# Patient Record
Sex: MALE | Race: WHITE | Employment: PART TIME | URBAN - METROPOLITAN AREA
[De-identification: names, ages, dates, MRNs, and addresses within clinical notes are randomized per-mention and may not be internally consistent; named-entity substitution may affect disease eponyms.]

---

## 2022-07-20 ENCOUNTER — HOSPITAL ENCOUNTER (OUTPATIENT)
Dept: PREADMISSION TESTING | Age: 32
Discharge: HOME | End: 2022-07-20
Payer: MEDICAID

## 2022-07-20 VITALS
BODY MASS INDEX: 30.27 KG/M2 | RESPIRATION RATE: 18 BRPM | SYSTOLIC BLOOD PRESSURE: 148 MMHG | OXYGEN SATURATION: 100 % | HEART RATE: 105 BPM | DIASTOLIC BLOOD PRESSURE: 78 MMHG | TEMPERATURE: 98.2 F | HEIGHT: 74 IN | WEIGHT: 235.89 LBS

## 2022-07-20 LAB
ANION GAP SERPL CALC-SCNC: 6 MMOL/L (ref 5–15)
APTT PPP: 28.2 SEC (ref 21.2–34.1)
BUN SERPL-MCNC: 12 MG/DL (ref 6–20)
BUN/CREAT SERPL: 12 (ref 12–20)
CA-I BLD-MCNC: 9.1 MG/DL (ref 8.5–10.1)
CHLORIDE SERPL-SCNC: 106 MMOL/L (ref 97–108)
CO2 SERPL-SCNC: 27 MMOL/L (ref 21–32)
CREAT SERPL-MCNC: 0.97 MG/DL (ref 0.7–1.3)
ERYTHROCYTE [DISTWIDTH] IN BLOOD BY AUTOMATED COUNT: 12.9 % (ref 11.5–14.5)
GLUCOSE SERPL-MCNC: 99 MG/DL (ref 65–100)
HCT VFR BLD AUTO: 43.4 % (ref 36.6–50.3)
HGB BLD-MCNC: 14.3 G/DL (ref 12.1–17)
INR PPP: 1 (ref 0.9–1.1)
MCH RBC QN AUTO: 28.9 PG (ref 26–34)
MCHC RBC AUTO-ENTMCNC: 32.9 G/DL (ref 30–36.5)
MCV RBC AUTO: 87.9 FL (ref 80–99)
NRBC # BLD: 0 K/UL (ref 0–0.01)
NRBC BLD-RTO: 0 PER 100 WBC
PLATELET # BLD AUTO: 287 K/UL (ref 150–400)
PMV BLD AUTO: 10.7 FL (ref 8.9–12.9)
POTASSIUM SERPL-SCNC: 3.9 MMOL/L (ref 3.5–5.1)
PROTHROMBIN TIME: 12.9 SEC (ref 11.9–14.6)
RBC # BLD AUTO: 4.94 M/UL (ref 4.1–5.7)
SODIUM SERPL-SCNC: 139 MMOL/L (ref 136–145)
THERAPEUTIC RANGE,PTTT: NORMAL SEC (ref 82–109)
WBC # BLD AUTO: 7.9 K/UL (ref 4.1–11.1)

## 2022-07-20 PROCEDURE — 80048 BASIC METABOLIC PNL TOTAL CA: CPT

## 2022-07-20 PROCEDURE — 85730 THROMBOPLASTIN TIME PARTIAL: CPT

## 2022-07-20 PROCEDURE — 85610 PROTHROMBIN TIME: CPT

## 2022-07-20 PROCEDURE — 85027 COMPLETE CBC AUTOMATED: CPT

## 2022-07-20 PROCEDURE — 36415 COLL VENOUS BLD VENIPUNCTURE: CPT

## 2022-07-20 NOTE — PROGRESS NOTES
Dr Yolanda Harper called regarding covid test order. Stated no covid test required unless per protocol.

## 2022-07-27 ENCOUNTER — ANESTHESIA EVENT (OUTPATIENT)
Dept: SURGERY | Age: 32
End: 2022-07-27
Payer: MEDICAID

## 2022-07-27 ENCOUNTER — HOSPITAL ENCOUNTER (OUTPATIENT)
Age: 32
Discharge: HOME | End: 2022-07-27
Attending: ORTHOPAEDIC SURGERY | Admitting: ORTHOPAEDIC SURGERY
Payer: MEDICAID

## 2022-07-27 ENCOUNTER — APPOINTMENT (OUTPATIENT)
Dept: GENERAL RADIOLOGY | Age: 32
End: 2022-07-27
Attending: ORTHOPAEDIC SURGERY
Payer: MEDICAID

## 2022-07-27 ENCOUNTER — ANESTHESIA (OUTPATIENT)
Dept: SURGERY | Age: 32
End: 2022-07-27
Payer: MEDICAID

## 2022-07-27 VITALS
DIASTOLIC BLOOD PRESSURE: 71 MMHG | HEIGHT: 74 IN | RESPIRATION RATE: 16 BRPM | OXYGEN SATURATION: 97 % | BODY MASS INDEX: 30.16 KG/M2 | HEART RATE: 83 BPM | WEIGHT: 235 LBS | SYSTOLIC BLOOD PRESSURE: 109 MMHG | TEMPERATURE: 97.2 F

## 2022-07-27 DIAGNOSIS — Z98.890 HISTORY OF REMOVAL OF RETAINED HARDWARE: Primary | ICD-10-CM

## 2022-07-27 PROBLEM — M22.42 CHONDROMALACIA OF LEFT PATELLA: Status: ACTIVE | Noted: 2022-07-27

## 2022-07-27 PROCEDURE — 74011250636 HC RX REV CODE- 250/636: Performed by: ANESTHESIOLOGY

## 2022-07-27 PROCEDURE — 77030031140 HC SUT VCRL3 J&J -A: Performed by: ORTHOPAEDIC SURGERY

## 2022-07-27 PROCEDURE — 74011250636 HC RX REV CODE- 250/636: Performed by: NURSE ANESTHETIST, CERTIFIED REGISTERED

## 2022-07-27 PROCEDURE — 74011000250 HC RX REV CODE- 250: Performed by: ANESTHESIOLOGY

## 2022-07-27 PROCEDURE — 77030038066 HC BLD SHVR ARTH -B: Performed by: ORTHOPAEDIC SURGERY

## 2022-07-27 PROCEDURE — 77030004435 HC BUR RND STRY -C: Performed by: ORTHOPAEDIC SURGERY

## 2022-07-27 PROCEDURE — 77030042022 HC SYST COLD THRPY BREG -B: Performed by: ORTHOPAEDIC SURGERY

## 2022-07-27 PROCEDURE — 77030000032 HC CUF TRNQT ZIMM -B: Performed by: ORTHOPAEDIC SURGERY

## 2022-07-27 PROCEDURE — 76010000135 HC OR TIME 4 TO 4.5 HR: Performed by: ORTHOPAEDIC SURGERY

## 2022-07-27 PROCEDURE — 76210000025 HC REC RM PH II 3 TO 3.5 HR: Performed by: ORTHOPAEDIC SURGERY

## 2022-07-27 PROCEDURE — 77030006819: Performed by: ORTHOPAEDIC SURGERY

## 2022-07-27 PROCEDURE — 77030034478 HC TU IRR ARTHRO PT ARTH -B: Performed by: ORTHOPAEDIC SURGERY

## 2022-07-27 PROCEDURE — 77030013079 HC BLNKT BAIR HGGR 3M -A: Performed by: ANESTHESIOLOGY

## 2022-07-27 PROCEDURE — 77030040361 HC SLV COMPR DVT MDII -B: Performed by: ORTHOPAEDIC SURGERY

## 2022-07-27 PROCEDURE — 77030008463 HC STPLR SKN PROX J&J -B: Performed by: ORTHOPAEDIC SURGERY

## 2022-07-27 PROCEDURE — 74011000250 HC RX REV CODE- 250

## 2022-07-27 PROCEDURE — 76210000006 HC OR PH I REC 0.5 TO 1 HR: Performed by: ORTHOPAEDIC SURGERY

## 2022-07-27 PROCEDURE — 74011250637 HC RX REV CODE- 250/637: Performed by: ORTHOPAEDIC SURGERY

## 2022-07-27 PROCEDURE — 77030002916 HC SUT ETHLN J&J -A: Performed by: ORTHOPAEDIC SURGERY

## 2022-07-27 PROCEDURE — 77030022877 HC TU IRR ARTHRO PMP ARTH -B: Performed by: ORTHOPAEDIC SURGERY

## 2022-07-27 PROCEDURE — 76060000039 HC ANESTHESIA 4 TO 4.5 HR: Performed by: ORTHOPAEDIC SURGERY

## 2022-07-27 PROCEDURE — 76000 FLUOROSCOPY <1 HR PHYS/QHP: CPT

## 2022-07-27 PROCEDURE — 77030008009 HC PRB ARTHO ABLT ARTH -C: Performed by: ORTHOPAEDIC SURGERY

## 2022-07-27 PROCEDURE — 2709999900 HC NON-CHARGEABLE SUPPLY: Performed by: ORTHOPAEDIC SURGERY

## 2022-07-27 PROCEDURE — 74011250636 HC RX REV CODE- 250/636: Performed by: ORTHOPAEDIC SURGERY

## 2022-07-27 PROCEDURE — 77030031139 HC SUT VCRL2 J&J -A: Performed by: ORTHOPAEDIC SURGERY

## 2022-07-27 PROCEDURE — 77030003666 HC NDL SPINAL BD -A: Performed by: ORTHOPAEDIC SURGERY

## 2022-07-27 PROCEDURE — 77030010509 HC AIRWY LMA MSK TELE -A: Performed by: ANESTHESIOLOGY

## 2022-07-27 PROCEDURE — 74011000250 HC RX REV CODE- 250: Performed by: NURSE ANESTHETIST, CERTIFIED REGISTERED

## 2022-07-27 RX ORDER — HYDROMORPHONE HYDROCHLORIDE 1 MG/ML
INJECTION, SOLUTION INTRAMUSCULAR; INTRAVENOUS; SUBCUTANEOUS AS NEEDED
Status: DISCONTINUED | OUTPATIENT
Start: 2022-07-27 | End: 2022-07-27 | Stop reason: HOSPADM

## 2022-07-27 RX ORDER — TRAMADOL HYDROCHLORIDE 50 MG/1
50 TABLET ORAL
Qty: 15 TABLET | Refills: 0 | Status: SHIPPED | OUTPATIENT
Start: 2022-07-27 | End: 2022-08-03

## 2022-07-27 RX ORDER — FENTANYL CITRATE 50 UG/ML
50 INJECTION, SOLUTION INTRAMUSCULAR; INTRAVENOUS AS NEEDED
Status: DISCONTINUED | OUTPATIENT
Start: 2022-07-27 | End: 2022-07-27 | Stop reason: HOSPADM

## 2022-07-27 RX ORDER — SODIUM CHLORIDE 0.9 % (FLUSH) 0.9 %
5-40 SYRINGE (ML) INJECTION EVERY 8 HOURS
Status: CANCELLED | OUTPATIENT
Start: 2022-07-27

## 2022-07-27 RX ORDER — ONDANSETRON 2 MG/ML
4 INJECTION INTRAMUSCULAR; INTRAVENOUS AS NEEDED
Status: CANCELLED | OUTPATIENT
Start: 2022-07-27

## 2022-07-27 RX ORDER — KETOROLAC TROMETHAMINE 30 MG/ML
INJECTION, SOLUTION INTRAMUSCULAR; INTRAVENOUS AS NEEDED
Status: DISCONTINUED | OUTPATIENT
Start: 2022-07-27 | End: 2022-07-27 | Stop reason: HOSPADM

## 2022-07-27 RX ORDER — SODIUM CHLORIDE 0.9 % (FLUSH) 0.9 %
5-40 SYRINGE (ML) INJECTION AS NEEDED
Status: CANCELLED | OUTPATIENT
Start: 2022-07-27

## 2022-07-27 RX ORDER — OXYCODONE AND ACETAMINOPHEN 5; 325 MG/1; MG/1
1 TABLET ORAL AS NEEDED
Status: CANCELLED | OUTPATIENT
Start: 2022-07-27

## 2022-07-27 RX ORDER — MORPHINE SULFATE 2 MG/ML
2 INJECTION, SOLUTION INTRAMUSCULAR; INTRAVENOUS
Status: CANCELLED | OUTPATIENT
Start: 2022-07-27

## 2022-07-27 RX ORDER — BUPIVACAINE HYDROCHLORIDE 5 MG/ML
INJECTION, SOLUTION EPIDURAL; INTRACAUDAL
Status: COMPLETED
Start: 2022-07-27 | End: 2022-07-27

## 2022-07-27 RX ORDER — CEPHALEXIN 250 MG/1
500 CAPSULE ORAL 4 TIMES DAILY
Qty: 20 CAPSULE | Refills: 0 | Status: SHIPPED | OUTPATIENT
Start: 2022-07-27

## 2022-07-27 RX ORDER — MIDAZOLAM HYDROCHLORIDE 1 MG/ML
INJECTION, SOLUTION INTRAMUSCULAR; INTRAVENOUS AS NEEDED
Status: DISCONTINUED | OUTPATIENT
Start: 2022-07-27 | End: 2022-07-27 | Stop reason: HOSPADM

## 2022-07-27 RX ORDER — KETOROLAC TROMETHAMINE 10 MG/1
10 TABLET, FILM COATED ORAL EVERY 6 HOURS
Qty: 20 TABLET | Refills: 0 | Status: SHIPPED | OUTPATIENT
Start: 2022-07-27

## 2022-07-27 RX ORDER — LIDOCAINE HYDROCHLORIDE 10 MG/ML
0.1 INJECTION, SOLUTION EPIDURAL; INFILTRATION; INTRACAUDAL; PERINEURAL AS NEEDED
Status: DISCONTINUED | OUTPATIENT
Start: 2022-07-27 | End: 2022-07-27 | Stop reason: HOSPADM

## 2022-07-27 RX ORDER — HYDROMORPHONE HYDROCHLORIDE 1 MG/ML
0.5 INJECTION, SOLUTION INTRAMUSCULAR; INTRAVENOUS; SUBCUTANEOUS
Status: CANCELLED | OUTPATIENT
Start: 2022-07-27

## 2022-07-27 RX ORDER — DEXMEDETOMIDINE HYDROCHLORIDE 100 UG/ML
INJECTION, SOLUTION INTRAVENOUS AS NEEDED
Status: DISCONTINUED | OUTPATIENT
Start: 2022-07-27 | End: 2022-07-27 | Stop reason: HOSPADM

## 2022-07-27 RX ORDER — ACETAMINOPHEN 500 MG
1000 TABLET ORAL
Qty: 60 TABLET | Refills: 1 | Status: SHIPPED | OUTPATIENT
Start: 2022-07-27

## 2022-07-27 RX ORDER — FENTANYL CITRATE 50 UG/ML
INJECTION, SOLUTION INTRAMUSCULAR; INTRAVENOUS AS NEEDED
Status: DISCONTINUED | OUTPATIENT
Start: 2022-07-27 | End: 2022-07-27 | Stop reason: HOSPADM

## 2022-07-27 RX ORDER — KETOROLAC TROMETHAMINE 30 MG/ML
30 INJECTION, SOLUTION INTRAMUSCULAR; INTRAVENOUS
Status: CANCELLED | OUTPATIENT
Start: 2022-07-27 | End: 2022-07-28

## 2022-07-27 RX ORDER — OXYCODONE HYDROCHLORIDE 5 MG/1
5 TABLET ORAL
Status: CANCELLED | OUTPATIENT
Start: 2022-07-27

## 2022-07-27 RX ORDER — BUPIVACAINE HYDROCHLORIDE AND EPINEPHRINE 5; 5 MG/ML; UG/ML
INJECTION, SOLUTION PERINEURAL AS NEEDED
Status: DISCONTINUED | OUTPATIENT
Start: 2022-07-27 | End: 2022-07-27 | Stop reason: HOSPADM

## 2022-07-27 RX ORDER — DIPHENHYDRAMINE HYDROCHLORIDE 50 MG/ML
12.5 INJECTION, SOLUTION INTRAMUSCULAR; INTRAVENOUS AS NEEDED
Status: CANCELLED | OUTPATIENT
Start: 2022-07-27 | End: 2022-07-27

## 2022-07-27 RX ORDER — SODIUM CHLORIDE, SODIUM LACTATE, POTASSIUM CHLORIDE, CALCIUM CHLORIDE 600; 310; 30; 20 MG/100ML; MG/100ML; MG/100ML; MG/100ML
INJECTION, SOLUTION INTRAVENOUS
Status: DISCONTINUED | OUTPATIENT
Start: 2022-07-27 | End: 2022-07-27 | Stop reason: HOSPADM

## 2022-07-27 RX ORDER — DEXAMETHASONE SODIUM PHOSPHATE 4 MG/ML
INJECTION, SOLUTION INTRA-ARTICULAR; INTRALESIONAL; INTRAMUSCULAR; INTRAVENOUS; SOFT TISSUE
Status: COMPLETED
Start: 2022-07-27 | End: 2022-07-27

## 2022-07-27 RX ORDER — BUPIVACAINE HYDROCHLORIDE 5 MG/ML
INJECTION, SOLUTION EPIDURAL; INTRACAUDAL
Status: COMPLETED | OUTPATIENT
Start: 2022-07-27 | End: 2022-07-27

## 2022-07-27 RX ORDER — OXYCODONE HYDROCHLORIDE 5 MG/1
5 TABLET ORAL
Qty: 15 TABLET | Refills: 0 | Status: SHIPPED | OUTPATIENT
Start: 2022-07-27 | End: 2022-08-03

## 2022-07-27 RX ORDER — CEFAZOLIN SODIUM 1 G/3ML
INJECTION, POWDER, FOR SOLUTION INTRAMUSCULAR; INTRAVENOUS AS NEEDED
Status: DISCONTINUED | OUTPATIENT
Start: 2022-07-27 | End: 2022-07-27 | Stop reason: HOSPADM

## 2022-07-27 RX ORDER — SODIUM CHLORIDE, SODIUM LACTATE, POTASSIUM CHLORIDE, CALCIUM CHLORIDE 600; 310; 30; 20 MG/100ML; MG/100ML; MG/100ML; MG/100ML
20 INJECTION, SOLUTION INTRAVENOUS CONTINUOUS
Status: DISCONTINUED | OUTPATIENT
Start: 2022-07-27 | End: 2022-07-27 | Stop reason: HOSPADM

## 2022-07-27 RX ORDER — DEXAMETHASONE SODIUM PHOSPHATE 4 MG/ML
INJECTION, SOLUTION INTRA-ARTICULAR; INTRALESIONAL; INTRAMUSCULAR; INTRAVENOUS; SOFT TISSUE AS NEEDED
Status: DISCONTINUED | OUTPATIENT
Start: 2022-07-27 | End: 2022-07-27 | Stop reason: HOSPADM

## 2022-07-27 RX ORDER — LIDOCAINE HYDROCHLORIDE 10 MG/ML
INJECTION INFILTRATION; PERINEURAL
Status: DISCONTINUED
Start: 2022-07-27 | End: 2022-07-27 | Stop reason: HOSPADM

## 2022-07-27 RX ORDER — PROPOFOL 10 MG/ML
INJECTION, EMULSION INTRAVENOUS AS NEEDED
Status: DISCONTINUED | OUTPATIENT
Start: 2022-07-27 | End: 2022-07-27 | Stop reason: HOSPADM

## 2022-07-27 RX ORDER — EPHEDRINE SULFATE/0.9% NACL/PF 50 MG/5 ML
5 SYRINGE (ML) INTRAVENOUS AS NEEDED
Status: CANCELLED | OUTPATIENT
Start: 2022-07-27

## 2022-07-27 RX ORDER — MIDAZOLAM HYDROCHLORIDE 1 MG/ML
0.5 INJECTION, SOLUTION INTRAMUSCULAR; INTRAVENOUS
Status: CANCELLED | OUTPATIENT
Start: 2022-07-27

## 2022-07-27 RX ORDER — ONDANSETRON 2 MG/ML
INJECTION INTRAMUSCULAR; INTRAVENOUS AS NEEDED
Status: DISCONTINUED | OUTPATIENT
Start: 2022-07-27 | End: 2022-07-27 | Stop reason: HOSPADM

## 2022-07-27 RX ORDER — OXYCODONE AND ACETAMINOPHEN 5; 325 MG/1; MG/1
1 TABLET ORAL ONCE
Status: COMPLETED | OUTPATIENT
Start: 2022-07-27 | End: 2022-07-27

## 2022-07-27 RX ORDER — LIDOCAINE HYDROCHLORIDE 20 MG/ML
INJECTION, SOLUTION EPIDURAL; INFILTRATION; INTRACAUDAL; PERINEURAL AS NEEDED
Status: DISCONTINUED | OUTPATIENT
Start: 2022-07-27 | End: 2022-07-27 | Stop reason: HOSPADM

## 2022-07-27 RX ORDER — DEXAMETHASONE SODIUM PHOSPHATE 4 MG/ML
INJECTION, SOLUTION INTRA-ARTICULAR; INTRALESIONAL; INTRAMUSCULAR; INTRAVENOUS; SOFT TISSUE
Status: COMPLETED | OUTPATIENT
Start: 2022-07-27 | End: 2022-07-27

## 2022-07-27 RX ORDER — SODIUM CHLORIDE 0.9 % (FLUSH) 0.9 %
5-40 SYRINGE (ML) INJECTION AS NEEDED
Status: DISCONTINUED | OUTPATIENT
Start: 2022-07-27 | End: 2022-07-27 | Stop reason: HOSPADM

## 2022-07-27 RX ORDER — SODIUM CHLORIDE 0.9 % (FLUSH) 0.9 %
5-40 SYRINGE (ML) INJECTION EVERY 8 HOURS
Status: DISCONTINUED | OUTPATIENT
Start: 2022-07-27 | End: 2022-07-27 | Stop reason: HOSPADM

## 2022-07-27 RX ORDER — FENTANYL CITRATE 50 UG/ML
50 INJECTION, SOLUTION INTRAMUSCULAR; INTRAVENOUS
Status: CANCELLED | OUTPATIENT
Start: 2022-07-27

## 2022-07-27 RX ADMIN — DEXMEDETOMIDINE HYDROCHLORIDE 10 MCG: 100 INJECTION, SOLUTION INTRAVENOUS at 14:41

## 2022-07-27 RX ADMIN — DEXMEDETOMIDINE HYDROCHLORIDE 10 MCG: 100 INJECTION, SOLUTION INTRAVENOUS at 13:55

## 2022-07-27 RX ADMIN — BUPIVACAINE HYDROCHLORIDE 20 ML: 5 INJECTION, SOLUTION EPIDURAL; INTRACAUDAL; PERINEURAL at 10:51

## 2022-07-27 RX ADMIN — LIDOCAINE HYDROCHLORIDE 80 MG: 20 INJECTION, SOLUTION EPIDURAL; INFILTRATION; INTRACAUDAL; PERINEURAL at 11:08

## 2022-07-27 RX ADMIN — DEXAMETHASONE SODIUM PHOSPHATE 4 MG: 4 INJECTION, SOLUTION INTRA-ARTICULAR; INTRALESIONAL; INTRAMUSCULAR; INTRAVENOUS; SOFT TISSUE at 11:12

## 2022-07-27 RX ADMIN — DEXAMETHASONE SODIUM PHOSPHATE 4 MG: 4 INJECTION, SOLUTION INTRA-ARTICULAR; INTRALESIONAL; INTRAMUSCULAR; INTRAVENOUS; SOFT TISSUE at 10:51

## 2022-07-27 RX ADMIN — PROPOFOL 200 MG: 10 INJECTION, EMULSION INTRAVENOUS at 11:08

## 2022-07-27 RX ADMIN — HYDROMORPHONE HYDROCHLORIDE 1 MG: 1 INJECTION, SOLUTION INTRAMUSCULAR; INTRAVENOUS; SUBCUTANEOUS at 14:54

## 2022-07-27 RX ADMIN — SODIUM CHLORIDE, POTASSIUM CHLORIDE, SODIUM LACTATE AND CALCIUM CHLORIDE 20 ML/HR: 600; 310; 30; 20 INJECTION, SOLUTION INTRAVENOUS at 08:30

## 2022-07-27 RX ADMIN — SODIUM CHLORIDE, POTASSIUM CHLORIDE, SODIUM LACTATE AND CALCIUM CHLORIDE: 600; 310; 30; 20 INJECTION, SOLUTION INTRAVENOUS at 10:58

## 2022-07-27 RX ADMIN — DEXMEDETOMIDINE HYDROCHLORIDE 5 MCG: 100 INJECTION, SOLUTION INTRAVENOUS at 11:35

## 2022-07-27 RX ADMIN — SODIUM CHLORIDE, POTASSIUM CHLORIDE, SODIUM LACTATE AND CALCIUM CHLORIDE: 600; 310; 30; 20 INJECTION, SOLUTION INTRAVENOUS at 14:45

## 2022-07-27 RX ADMIN — MIDAZOLAM HYDROCHLORIDE 2 MG: 2 INJECTION, SOLUTION INTRAMUSCULAR; INTRAVENOUS at 10:58

## 2022-07-27 RX ADMIN — OXYCODONE AND ACETAMINOPHEN 1 TABLET: 5; 325 TABLET ORAL at 18:02

## 2022-07-27 RX ADMIN — PHENYLEPHRINE HYDROCHLORIDE 100 MCG: 10 INJECTION INTRAVENOUS at 11:54

## 2022-07-27 RX ADMIN — KETOROLAC TROMETHAMINE 30 MG: 30 INJECTION, SOLUTION INTRAMUSCULAR at 14:54

## 2022-07-27 RX ADMIN — DEXMEDETOMIDINE HYDROCHLORIDE 5 MCG: 100 INJECTION, SOLUTION INTRAVENOUS at 11:45

## 2022-07-27 RX ADMIN — ONDANSETRON 4 MG: 2 INJECTION INTRAMUSCULAR; INTRAVENOUS at 14:43

## 2022-07-27 RX ADMIN — CEFAZOLIN SODIUM 2 G: 1 INJECTION, POWDER, FOR SOLUTION INTRAMUSCULAR; INTRAVENOUS at 11:14

## 2022-07-27 RX ADMIN — FENTANYL CITRATE 100 MCG: 50 INJECTION, SOLUTION INTRAMUSCULAR; INTRAVENOUS at 11:08

## 2022-07-27 NOTE — PROGRESS NOTES
Left leg dressing clean dry intact without signs of drainage. Left foot cool, positive sensation/mobility.

## 2022-07-27 NOTE — PROGRESS NOTES
Called Dr. Wali Crandall to clarify ancef ordered for pt, with penicillin allergy. . Pt stated had hives and rash when had it in childhood and has not had any antibiotics related to penicillin.  form his office stated provider will call back. Dr. Wali Crandall called back and was informed of pt allergy, and reaction, he stated to proceed with ancef.

## 2022-07-27 NOTE — ANESTHESIA PROCEDURE NOTES
Peripheral Block    Start time: 7/27/2022 10:50 AM  End time: 7/27/2022 3:04 PM  Performed by: Cindy Geiger MD  Authorized by: Cindy Geiger MD       Pre-procedure: Indications: at surgeon's request and post-op pain management    Preanesthetic Checklist: patient identified, risks and benefits discussed, site marked, timeout performed, anesthesia consent given, patient being monitored and fire risk safety assessment completed and verbalized    Timeout Time: 10:40 EDT      Block Type:   Block Type: Adductor canal block  Laterality:  Left  Monitoring:  Standard ASA monitoring, continuous pulse ox, frequent vital sign checks, heart rate, responsive to questions and oxygen  Injection Technique:  Single shot  Procedures: ultrasound guided    Patient Position: supine  Prep comment:  Povidone/iodine x 3  Needle Type:  SonoPlex  Needle Gauge:  21 G  Needle Localization:  Anatomical landmarks and ultrasound guidance  Medication Injected:  Bupivacaine (PF) (MARCAINE) 0.5% injection - Peripheral Nerve Block   20 mL - 7/27/2022 10:51:00 AM  dexamethasone (DECADRON) 4 mg/mL injection - Peripheral Nerve Block   4 mg - 7/27/2022 10:51:00 AM  Med Admin Time: 7/27/2022 10:51 AM    Assessment:  Number of attempts:  1  Injection Assessment:  Incremental injection every 5 mL, no paresthesia, ultrasound image on chart, local visualized surrounding nerve on ultrasound, negative aspiration for blood and low pressure verified by pressure monitor  Patient tolerance:  Patient tolerated the procedure well with no immediate complications  LEFT ADDUCTOR CANAL block. Performed preop, in holding Rm. Strict asepsis,structures identified with sonosite, lidocaine 1% 2ml local/skin wheel. No complications.

## 2022-07-27 NOTE — PROGRESS NOTES
This RN at bedside to discharge patient when noted bleeding coming from surgical site on left lower leg. Contacted Ree Player and was given verbal orders on how to re-wrap his surgical site. García Glimpse from OR over to help wrap his leg. Verbal order per  to give a dose Percocet 5mg for pain. Order placed by this RN. Also other verbal order per Ree Player for patient to go home with a polar care ice pack. Meeta Mackey RN and this RN instructed mother and patient on how to use at home.

## 2022-07-27 NOTE — PROGRESS NOTES
Discharge instructions given to mother Tona Quiroz.  Appt made for 2 week fu mother and patient made aware of date and time of follow up

## 2022-07-27 NOTE — OP NOTES
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 Procedure(s):  1) LEFT KNEE ARTHROSCOPY WITH CHRONDROPLASTY DEBRIDEMENT OF PATELLA. 2) REMOVAL OF DEEP HARDWARE (IM NAIL AND LOCKING SCREWS) LEFT TIBIA - COMPLEX    Surgeon(s):  Michele Donohue MD    Surgical Assistant: Surg Asst-1: Roderick Subramanian    Anesthesia: General with regional block by Dr. Charu Sánchez and Woody Pallas    Estimated Blood Loss (mL):  less than 50     Complications: None    Tourniquet Time: 87 mins  Implants:   Implant Name Type Inv. Item Serial No.  Lot No. LRB No. Used Action   Screw   N/A  N/A Left 4 Explanted   Nail   N/A  N/A Left 1 Explanted       Preop Note: The patient is a 28-year-old gentleman who underwent management of an open fracture of his left tibia with the use of an intramedullary nail, more than 7 years ago. He went on to heal well with no long-term complications. The patient presented in my office few months ago, with progressive anterior knee pain, as well as activity related pain in his left leg. Clinical evaluation as well as an MRI of the left knee suggested patellofemoral chondromalacia, in addition to the retained hardware possibly underlying his symptoms. The patient was suggested and offered arthroscopy of the left knee with possible chondroplasty and debridement, along with an attempt at removal of the retained hardware. The patient wished to proceed with the recommended treatment plan. Informed consent was obtained and documented in the office.     Findings: 1) Significant changes of chondromalacia, Outerbridge stage II and stage III, and the median ridge of the patella, as well as femoral trochlear groove showing grade 2 changes. Ligaments and menisci were normal, along with medial and lateral compartment articular cartilage. Lateral and medial gutters were clear, spacious, normal.  Patellar tracking was normal.  2) proximal locking screws showed cold welding of the metal.  One of the proximal locking screws had fragment of broken guidewire metal, incarcerated in the screw head. These findings necessitated prolonged work at screw removal, along with the use of metal cutting pencil-tip removal of the broken metal fragment in the screw head. 3) all retained hardware was successfully removed, without any evidence of fractures or other complications. Detailed Description of Procedure: The patient and operative site were identified in the preoperative holding area. All questions were answered. After induction of anesthesia the patient was positioned supine on the OR table. High thigh tourniquet was set at 300 mmHg. Standard prepping and draping of the left lower extremity was performed with ChloraPrep, using an arthroscopy drape. Intravenous antibiotics were confirmed. Timeout was called. I used a standard anterolateral portal, and performed a diagnostic arthroscopy. Standard anteromedial portal was created under direct vision using a spinal needle for guide. Findings were as noted above. I used a 4.5 mm shaver to perform chondroplasty of the patellar chondral lesions, along with minimal use of an ArthroWand to debrided to smooth and stable service. Pre and post procedure photographs were obtained and recorded. Local anesthetic was infiltrated in the portals, and instilled in the joint. Nylon sutures were used to close the skin portals. We proceeded with the second stage, namely removal of hardware. The previous anterior midline prepatellar scar was reused, to perform a standard exposure through the sheath of the patellar tendon, going medially.   The fat pad was carefully retracted superiorly, and adequate exposure of the anterior surface of the tibial plateau was obtained. An osteotome was used to activate a small amount of bone, and the proximal end of the nail was easily accessed, and well visualized after debriding surrounding bone with a curette. The endcap was carefully unscrewed, and the inner and imagining screw was removed as well. We proceeded with removal of the locking screws. This was tedious, and took a significant amount of time due to stripping of the screw heads, and the use of the broken screw removal set for removal.  The proximal of the locking screws showed cold welding as well as significant resistance due to bone growth into the titanium. The patient's and great care, the screw was finally removed. The second screw and a fragment of metal broken and retained in the screw head from the initial surgery. After multiple attempts at removal of the screw, I used a metal cutting tip to shave off the metal fragment in the screw head. This was followed by removal of the screw with a reverse threaded screwdriver. The nail was now removed uneventfully, and showed evidence of bony ingrowth at his proximal ends with no significant loss of bone. The wounds were thoroughly irrigated. Fluoroscopic images were obtained and saved. Closure of the wounds was performed with standard technique in layers. A compressive dressing was applied. The patient tolerated the procedure well and was transferred to the recovery room in stable condition. Postoperatively, the patient will be allowed weightbearing as tolerated, with 2 crutches at all times for the next 4 weeks. Follow-up will be in 2 weeks as an outpatient.     Electronically Signed by Lloyd Velasco MD on 7/27/2022 at 3:35 PM

## 2022-07-27 NOTE — ANESTHESIA PREPROCEDURE EVALUATION
Relevant Problems   No relevant active problems       Anesthetic History   No history of anesthetic complications            Review of Systems / Medical History  Patient summary reviewed, nursing notes reviewed and pertinent labs reviewed    Pulmonary  Within defined limits                 Neuro/Psych   Within defined limits           Cardiovascular  Within defined limits                Exercise tolerance: >4 METS     GI/Hepatic/Renal  Within defined limits              Endo/Other        Obesity and arthritis     Other Findings   Comments: Left patella chondromalacia. Anesthetic Plan    ASA: 1  Anesthesia type: general and regional  ADDUCTOR CANAL BLOCK.          Induction: Intravenous  Anesthetic plan and risks discussed with: Patient

## 2022-07-27 NOTE — ANESTHESIA POSTPROCEDURE EVALUATION
Procedure(s):  LEFT KNEE ARTHROSCOPY WITH CHRONDROPLASTY DEBRIDEMENT AND REMOVAL OF DEEP HARDWARE (IM NAIL AND LOCKING SCREWS) LEFT TIBIA  HARDWARE REMOVAL LOWER EXTREMITES.     general, regional    Anesthesia Post Evaluation        Patient location during evaluation: PACU  Patient participation: complete - patient participated  Level of consciousness: awake and alert  Pain score: 0  Pain management: adequate  Airway patency: patent  Anesthetic complications: no  Cardiovascular status: hemodynamically stable  Respiratory status: spontaneous ventilation  Hydration status: acceptable  Post anesthesia nausea and vomiting:  controlled  Final Post Anesthesia Temperature Assessment:  Normothermia (36.0-37.5 degrees C)      INITIAL Post-op Vital signs:   Vitals Value Taken Time   /61 07/27/22 1538   Temp 36.2 °C (97.2 °F) 07/27/22 1524   Pulse 66 07/27/22 1538   Resp 12 07/27/22 1538   SpO2 97 % 07/27/22 1538

## 2023-05-17 RX ORDER — CEPHALEXIN 250 MG/1
500 CAPSULE ORAL 4 TIMES DAILY
COMMUNITY
Start: 2022-07-27

## 2023-05-17 RX ORDER — KETOROLAC TROMETHAMINE 10 MG/1
10 TABLET, FILM COATED ORAL EVERY 6 HOURS
COMMUNITY
Start: 2022-07-27

## 2023-05-17 RX ORDER — ACETAMINOPHEN 500 MG
1000 TABLET ORAL EVERY 6 HOURS PRN
COMMUNITY
Start: 2022-07-27

## 2025-03-04 ENCOUNTER — OFFICE VISIT (OUTPATIENT)
Age: 35
End: 2025-03-04

## 2025-03-04 VITALS
HEART RATE: 98 BPM | RESPIRATION RATE: 16 BRPM | SYSTOLIC BLOOD PRESSURE: 124 MMHG | DIASTOLIC BLOOD PRESSURE: 70 MMHG | BODY MASS INDEX: 34.14 KG/M2 | WEIGHT: 266 LBS | OXYGEN SATURATION: 97 % | HEIGHT: 74 IN

## 2025-03-04 DIAGNOSIS — J34.2 NASAL SEPTAL DEVIATION: ICD-10-CM

## 2025-03-04 DIAGNOSIS — J32.4 CHRONIC PANSINUSITIS: Primary | ICD-10-CM

## 2025-03-04 NOTE — PROGRESS NOTES
Nasopharynx clear without masses or lesions. Eustachian tube orifice unremarkable.         Assessment and Plan:   Nasal congestion  Nasal septal deviation  Chronic sinusitis  - Discussed options   - He has been on various antibiotic Rx as well as flonase, allergy pills  - Scope exam does show septal deviation - Discussed role of septoplasty  - However given recurrent sinusitis and some changes at middle meatus, will check sinus CT imaging first  - Follow up after CT   - Can review role of surgery accordingly  - Discussed ear exam is otherwise clear today        The patient was instructed to return to clinic if no improvement or progression of symptoms. Signs to watch out for reviewed.      Bhavya Connell MD   [facility]  [address]  [place] [address]  Office Phone: [phone]

## 2025-03-19 ENCOUNTER — HOSPITAL ENCOUNTER (OUTPATIENT)
Facility: HOSPITAL | Age: 35
Discharge: HOME | End: 2025-03-22
Attending: OTOLARYNGOLOGY
Payer: MEDICAID

## 2025-03-19 DIAGNOSIS — J32.4 CHRONIC PANSINUSITIS: ICD-10-CM

## 2025-03-19 PROCEDURE — 70486 CT MAXILLOFACIAL W/O DYE: CPT

## 2025-03-27 ENCOUNTER — TELEPHONE (OUTPATIENT)
Age: 35
End: 2025-03-27

## 2025-03-27 NOTE — TELEPHONE ENCOUNTER
Patient came into the office wanting to know if you had looked at his CT scan results so that he could schedule a follow up appointment to talk about the results.

## 2025-03-28 ENCOUNTER — RESULTS FOLLOW-UP (OUTPATIENT)
Age: 35
End: 2025-03-28

## 2025-04-28 ENCOUNTER — OFFICE VISIT (OUTPATIENT)
Age: 35
End: 2025-04-28
Payer: MEDICAID

## 2025-04-28 VITALS
HEART RATE: 102 BPM | BODY MASS INDEX: 33.62 KG/M2 | HEIGHT: 74 IN | OXYGEN SATURATION: 97 % | RESPIRATION RATE: 16 BRPM | DIASTOLIC BLOOD PRESSURE: 76 MMHG | WEIGHT: 262 LBS | SYSTOLIC BLOOD PRESSURE: 132 MMHG

## 2025-04-28 DIAGNOSIS — J34.2 NASAL SEPTAL DEVIATION: ICD-10-CM

## 2025-04-28 DIAGNOSIS — J32.4 CHRONIC PANSINUSITIS: Primary | ICD-10-CM

## 2025-04-28 PROCEDURE — 99214 OFFICE O/P EST MOD 30 MIN: CPT | Performed by: OTOLARYNGOLOGY

## 2025-04-28 NOTE — PROGRESS NOTES
Otolaryngology-Head and Neck Surgery  Follow-up patient Visit     Patient: Felix Nicolas  YOB: 1990  MRN: [id number]  Date of Service: 4/28/2025    Chief Complaint:   Chief Complaint   Patient presents with    Follow-up     Ct scan results       Interval history  Had sinus CT    History of Present Illness: Felix Nicolas is a 34 y.o. male who presents today for discussion of chronic nasal congestion   Also wonders about cerumen impaction     Chronic nasal congestion   Bilateral  Ongoing for years     Some allergies but above symptoms are year round   Does take claritin and flonase PRN    No prior nasal surgery    Recurrent sinusitis - several episodes in a year  Last episode in November requiring antibiotic Rx     Past Medical History:  History reviewed. No pertinent past medical history.    Past Surgical History:   Past Surgical History:   Procedure Laterality Date    OTHER SURGICAL HISTORY  2013    leg surgery, broke left leg       Medications:   Current Outpatient Medications   Medication Instructions    acetaminophen (TYLENOL) 1,000 mg, EVERY 6 HOURS PRN    cephALEXin (KEFLEX) 500 mg, 4 TIMES DAILY    ketorolac (TORADOL) 10 mg, EVERY 6 HOURS       Allergies:   Allergies   Allergen Reactions    Penicillins Hives and Rash       Social History:   Social History     Tobacco Use    Smoking status: Never    Smokeless tobacco: Never   Substance Use Topics    Alcohol use: Yes    Drug use: Never        Family History:  Family History   Problem Relation Age of Onset    No Known Problems Mother     Diabetes Father        Review of Systems:    Consitutional: denies fever, excessive weight gain or loss.  Eyes: denies diplopia, eye pain.  Integumentary: denies new concerning skin lesions.  Ears, Nose, Mouth, Throat: denies except as per HPI.  Endocrine: denies hot or cold intolerance, increased thirst.  Respiratory: denies cough, hemoptysis, wheezing  Gastrointestinal: denies trouble swallowing, nausea, emesis,

## 2025-05-05 ENCOUNTER — PREP FOR PROCEDURE (OUTPATIENT)
Age: 35
End: 2025-05-05

## 2025-05-05 DIAGNOSIS — J34.2 NASAL SEPTAL DEVIATION: ICD-10-CM

## 2025-05-05 DIAGNOSIS — J32.4 CHRONIC PANSINUSITIS: ICD-10-CM

## 2025-05-07 ENCOUNTER — OFFICE VISIT (OUTPATIENT)
Age: 35
End: 2025-05-07
Payer: MEDICAID

## 2025-05-07 VITALS
DIASTOLIC BLOOD PRESSURE: 76 MMHG | OXYGEN SATURATION: 99 % | HEIGHT: 74 IN | BODY MASS INDEX: 33.62 KG/M2 | SYSTOLIC BLOOD PRESSURE: 118 MMHG | RESPIRATION RATE: 16 BRPM | HEART RATE: 83 BPM

## 2025-05-07 DIAGNOSIS — J34.2 NASAL SEPTAL DEVIATION: ICD-10-CM

## 2025-05-07 DIAGNOSIS — J32.4 CHRONIC PANSINUSITIS: Primary | ICD-10-CM

## 2025-05-07 PROCEDURE — 99214 OFFICE O/P EST MOD 30 MIN: CPT | Performed by: OTOLARYNGOLOGY

## 2025-05-07 NOTE — H&P (VIEW-ONLY)
Otolaryngology-Head and Neck Surgery  Follow-up patient Visit     Patient: Felix Nicolas  YOB: 1990  MRN: [id number]  Date of Service: 5/7/2025      Chief Complaint:   Chief Complaint   Patient presents with    Pre-op Exam       Preop patient visit  5/7/2025   Plan for surgery 5/23 at [facility]     History of Present Illness: Felix Nicolas is a 34 y.o. male who presents today for discussion of chronic nasal congestion   Also wonders about cerumen impaction     Chronic nasal congestion   Bilateral  Ongoing for years     Some allergies but above symptoms are year round   Does take claritin and flonase PRN    No prior nasal surgery    Recurrent sinusitis - several episodes in a year  Last episode in November requiring antibiotic Rx     Past Medical History:  History reviewed. No pertinent past medical history.    Past Surgical History:   Past Surgical History:   Procedure Laterality Date    OTHER SURGICAL HISTORY  2013    leg surgery, broke left leg       Medications:   Current Outpatient Medications   Medication Instructions    acetaminophen (TYLENOL) 1,000 mg, EVERY 6 HOURS PRN    cephALEXin (KEFLEX) 500 mg, 4 TIMES DAILY    ketorolac (TORADOL) 10 mg, EVERY 6 HOURS       Allergies:   Allergies   Allergen Reactions    Penicillins Hives and Rash       Social History:   Social History     Tobacco Use    Smoking status: Never    Smokeless tobacco: Never   Substance Use Topics    Alcohol use: Yes    Drug use: Never        Family History:  Family History   Problem Relation Age of Onset    No Known Problems Mother     Diabetes Father        Review of Systems:    Consitutional: denies fever, excessive weight gain or loss.  Eyes: denies diplopia, eye pain.  Integumentary: denies new concerning skin lesions.  Ears, Nose, Mouth, Throat: denies except as per HPI.  Endocrine: denies hot or cold intolerance, increased thirst.  Respiratory: denies cough, hemoptysis, wheezing  Gastrointestinal: denies trouble  pills  - Reviewed sinus CT imaging confirming septal deviation as well as bilateral chronic sinusitis  - We discussed the role of surgery to include septoplasty, bilateral inferior turbinate reduction, bilateral maxillary antrostomy, bilateral total ethmoidectomy, possible frontal sinusotomy  - Risks, benefits, alternatives reviewed including risk of bleeding, infection, scarring, need for repeat or revision surgery, septal perforation, injury to orbit or skull base, CSF leak, anesthetic risks      The patient was instructed to return to clinic if no improvement or progression of symptoms. Signs to watch out for reviewed.      MD Leatha Blanco - [facility]  [address]  [place] [address]  Office Phone: [phone]

## 2025-05-07 NOTE — PROGRESS NOTES
Otolaryngology-Head and Neck Surgery  Follow-up patient Visit     Patient: Felix Nicolas  YOB: 1990  MRN: [id number]  Date of Service: 5/7/2025      Chief Complaint:   Chief Complaint   Patient presents with    Pre-op Exam       Preop patient visit  5/7/2025   Plan for surgery 5/23 at [facility]     History of Present Illness: Felix Nicolas is a 34 y.o. male who presents today for discussion of chronic nasal congestion   Also wonders about cerumen impaction     Chronic nasal congestion   Bilateral  Ongoing for years     Some allergies but above symptoms are year round   Does take claritin and flonase PRN    No prior nasal surgery    Recurrent sinusitis - several episodes in a year  Last episode in November requiring antibiotic Rx     Past Medical History:  History reviewed. No pertinent past medical history.    Past Surgical History:   Past Surgical History:   Procedure Laterality Date    OTHER SURGICAL HISTORY  2013    leg surgery, broke left leg       Medications:   Current Outpatient Medications   Medication Instructions    acetaminophen (TYLENOL) 1,000 mg, EVERY 6 HOURS PRN    cephALEXin (KEFLEX) 500 mg, 4 TIMES DAILY    ketorolac (TORADOL) 10 mg, EVERY 6 HOURS       Allergies:   Allergies   Allergen Reactions    Penicillins Hives and Rash       Social History:   Social History     Tobacco Use    Smoking status: Never    Smokeless tobacco: Never   Substance Use Topics    Alcohol use: Yes    Drug use: Never        Family History:  Family History   Problem Relation Age of Onset    No Known Problems Mother     Diabetes Father        Review of Systems:    Consitutional: denies fever, excessive weight gain or loss.  Eyes: denies diplopia, eye pain.  Integumentary: denies new concerning skin lesions.  Ears, Nose, Mouth, Throat: denies except as per HPI.  Endocrine: denies hot or cold intolerance, increased thirst.  Respiratory: denies cough, hemoptysis, wheezing  Gastrointestinal: denies trouble

## 2025-05-12 ENCOUNTER — HOSPITAL ENCOUNTER (OUTPATIENT)
Facility: HOSPITAL | Age: 35
Discharge: HOME | End: 2025-05-15

## 2025-05-12 VITALS
TEMPERATURE: 97.5 F | HEART RATE: 76 BPM | RESPIRATION RATE: 20 BRPM | SYSTOLIC BLOOD PRESSURE: 135 MMHG | BODY MASS INDEX: 35.12 KG/M2 | WEIGHT: 264.99 LBS | HEIGHT: 73 IN | DIASTOLIC BLOOD PRESSURE: 92 MMHG | OXYGEN SATURATION: 96 %

## 2025-05-12 NOTE — DISCHARGE INSTRUCTIONS
[facility]                   [address] [place] [address]   PRE-ADMISSION TESTING    [phone]     Surgery Date:  Friday 5/23/25         INSTRUCTIONS BEFORE YOUR SURGERY   Arrival Time [facility] Pre-op staff will call you between 3 and 7pm the day before your surgery with your arrival time. If your surgery is on a Monday, they will call you the Friday before. If it's after 7pm the day prior to surgery and you have not received a time yet, please call [phone].   Where to Check In   Come through the Main Hospital entrance. Take the elevators on the left side of the lobby to the 2nd floor. The admitting desk will be on your right.     Please bring the following items to register:  photo ID, insurance card, co-pay if needed, medical directive, DNR, and/or POA if applicable.     Free  parking is available 7am to 5pm.   Food Drink Alcohol Marijuana    No food or drink (gum, mints, coffee, juice, etc) after midnight the night before surgery.      No alcohol (beer, wine, liquor) or marijuana 24 hours before or after surgery.           You may drink WATER ONLY up until 2 hours prior to your surgery time. Please do not      add anything to your water as this may result in your surgery being postponed.          PRE-OP Medication Instructions      MEDICATIONS TO TAKE THE MORNING OF SURGERY:   none                  Medications to STOP 7 Days Before Surgery Non-Steroidal anti-inflammatory Drugs (NSAID's): for example: Ibuprofen, Advil, Motrin, Naproxen, Aleve  Aspirin and Aspirin containing products (BC Powder, Excedrin, etc.)  Weight loss and/or diabetic GLP1 medications (Wegovy, Ozempic, Semaglutide, Trulicity, Mounjaro, Zepbound, Tirzepatide, etc)  Herbal supplements, vitamins, and fish oil  Other:   Blood Thinners    Pre-op Hygiene     Do not shave or trim 24 hours prior to your surgery.    The morning of surgery:  Please shower with Dial antimicrobial soap and dry off

## 2025-05-13 ENCOUNTER — TELEPHONE (OUTPATIENT)
Age: 35
End: 2025-05-13

## 2025-05-21 ENCOUNTER — ANESTHESIA EVENT (OUTPATIENT)
Facility: HOSPITAL | Age: 35
End: 2025-05-21
Payer: MEDICAID

## 2025-05-22 NOTE — PERIOP NOTE
Hello,     You are scheduled to have surgery tomorrow at [facility].     We would like for you to arrive at  1015 am  We are located on the second floor, [address]. You will check-in at the registration desk located outside the elevators on the second floor prior to proceeding to [address].  Remember nothing to eat or drink after midnight. If you need to take medications the morning of surgery, please take with a few sips of water.   Wear loose, comfortable clothing and leave all your jewelry at home.   You may bring your cell phone with you.  One family member will be allowed in the pre-op area once you are dressed and your IV has been started.   You will need someone to drive you home and be with you for 24 hours post-anesthesia.     We look forward to seeing you! Call [phone] for questions after hours and [phone] between 5:30AM and 6PM.     Thanks!    [facility] PREOP TEAM

## 2025-05-23 ENCOUNTER — HOSPITAL ENCOUNTER (OUTPATIENT)
Facility: HOSPITAL | Age: 35
Setting detail: OUTPATIENT SURGERY
Discharge: HOME | End: 2025-05-23
Attending: OTOLARYNGOLOGY | Admitting: OTOLARYNGOLOGY
Payer: MEDICAID

## 2025-05-23 ENCOUNTER — ANESTHESIA (OUTPATIENT)
Facility: HOSPITAL | Age: 35
End: 2025-05-23
Payer: MEDICAID

## 2025-05-23 VITALS
RESPIRATION RATE: 8 BRPM | BODY MASS INDEX: 35.56 KG/M2 | OXYGEN SATURATION: 97 % | SYSTOLIC BLOOD PRESSURE: 118 MMHG | DIASTOLIC BLOOD PRESSURE: 92 MMHG | HEART RATE: 74 BPM | WEIGHT: 265.88 LBS | TEMPERATURE: 98.5 F

## 2025-05-23 DIAGNOSIS — Z98.890 S/P FESS (FUNCTIONAL ENDOSCOPIC SINUS SURGERY): Primary | ICD-10-CM

## 2025-05-23 PROCEDURE — 31267 ENDOSCOPY MAXILLARY SINUS: CPT | Performed by: OTOLARYNGOLOGY

## 2025-05-23 PROCEDURE — 2720000010 HC SURG SUPPLY STERILE: Performed by: OTOLARYNGOLOGY

## 2025-05-23 PROCEDURE — 3700000000 HC ANESTHESIA ATTENDED CARE: Performed by: OTOLARYNGOLOGY

## 2025-05-23 PROCEDURE — 2580000003 HC RX 258: Performed by: ANESTHESIOLOGY

## 2025-05-23 PROCEDURE — 6360000002 HC RX W HCPCS: Performed by: NURSE ANESTHETIST, CERTIFIED REGISTERED

## 2025-05-23 PROCEDURE — 3600000004 HC SURGERY LEVEL 4 BASE: Performed by: OTOLARYNGOLOGY

## 2025-05-23 PROCEDURE — 31255 NSL/SINS NDSC W/TOT ETHMDCT: CPT | Performed by: OTOLARYNGOLOGY

## 2025-05-23 PROCEDURE — 30140 RESECT INFERIOR TURBINATE: CPT | Performed by: OTOLARYNGOLOGY

## 2025-05-23 PROCEDURE — 61782 SCAN PROC CRANIAL EXTRA: CPT | Performed by: OTOLARYNGOLOGY

## 2025-05-23 PROCEDURE — 6370000000 HC RX 637 (ALT 250 FOR IP): Performed by: OTOLARYNGOLOGY

## 2025-05-23 PROCEDURE — 6360000002 HC RX W HCPCS: Performed by: OTOLARYNGOLOGY

## 2025-05-23 PROCEDURE — 3700000001 HC ADD 15 MINUTES (ANESTHESIA): Performed by: OTOLARYNGOLOGY

## 2025-05-23 PROCEDURE — 2709999900 HC NON-CHARGEABLE SUPPLY: Performed by: OTOLARYNGOLOGY

## 2025-05-23 PROCEDURE — 7100000010 HC PHASE II RECOVERY - FIRST 15 MIN: Performed by: OTOLARYNGOLOGY

## 2025-05-23 PROCEDURE — 30520 REPAIR OF NASAL SEPTUM: CPT | Performed by: OTOLARYNGOLOGY

## 2025-05-23 PROCEDURE — 7100000000 HC PACU RECOVERY - FIRST 15 MIN: Performed by: OTOLARYNGOLOGY

## 2025-05-23 PROCEDURE — 2500000003 HC RX 250 WO HCPCS: Performed by: NURSE ANESTHETIST, CERTIFIED REGISTERED

## 2025-05-23 PROCEDURE — 7100000001 HC PACU RECOVERY - ADDTL 15 MIN: Performed by: OTOLARYNGOLOGY

## 2025-05-23 PROCEDURE — 3600000014 HC SURGERY LEVEL 4 ADDTL 15MIN: Performed by: OTOLARYNGOLOGY

## 2025-05-23 PROCEDURE — 7100000011 HC PHASE II RECOVERY - ADDTL 15 MIN: Performed by: OTOLARYNGOLOGY

## 2025-05-23 RX ORDER — MIDAZOLAM HYDROCHLORIDE 2 MG/2ML
2 INJECTION, SOLUTION INTRAMUSCULAR; INTRAVENOUS
Status: DISCONTINUED | OUTPATIENT
Start: 2025-05-23 | End: 2025-05-23 | Stop reason: HOSPADM

## 2025-05-23 RX ORDER — PROPOFOL 10 MG/ML
INJECTION, EMULSION INTRAVENOUS
Status: DISCONTINUED | OUTPATIENT
Start: 2025-05-23 | End: 2025-05-23 | Stop reason: SDUPTHER

## 2025-05-23 RX ORDER — LIDOCAINE HYDROCHLORIDE 10 MG/ML
1 INJECTION, SOLUTION EPIDURAL; INFILTRATION; INTRACAUDAL; PERINEURAL
Status: DISCONTINUED | OUTPATIENT
Start: 2025-05-23 | End: 2025-05-23 | Stop reason: HOSPADM

## 2025-05-23 RX ORDER — FAMOTIDINE 10 MG/ML
INJECTION, SOLUTION INTRAVENOUS
Status: DISCONTINUED | OUTPATIENT
Start: 2025-05-23 | End: 2025-05-23 | Stop reason: SDUPTHER

## 2025-05-23 RX ORDER — HYDROCODONE BITARTRATE AND ACETAMINOPHEN 5; 325 MG/1; MG/1
1 TABLET ORAL EVERY 6 HOURS PRN
Qty: 20 TABLET | Refills: 0 | Status: SHIPPED | OUTPATIENT
Start: 2025-05-23 | End: 2025-05-28

## 2025-05-23 RX ORDER — DEXMEDETOMIDINE HYDROCHLORIDE 100 UG/ML
INJECTION, SOLUTION INTRAVENOUS
Status: DISCONTINUED | OUTPATIENT
Start: 2025-05-23 | End: 2025-05-23 | Stop reason: SDUPTHER

## 2025-05-23 RX ORDER — FENTANYL CITRATE 50 UG/ML
100 INJECTION, SOLUTION INTRAMUSCULAR; INTRAVENOUS
Status: DISCONTINUED | OUTPATIENT
Start: 2025-05-23 | End: 2025-05-23 | Stop reason: HOSPADM

## 2025-05-23 RX ORDER — ONDANSETRON 2 MG/ML
INJECTION INTRAMUSCULAR; INTRAVENOUS
Status: DISCONTINUED | OUTPATIENT
Start: 2025-05-23 | End: 2025-05-23 | Stop reason: SDUPTHER

## 2025-05-23 RX ORDER — PREDNISONE 10 MG/1
TABLET ORAL
Qty: 24 TABLET | Refills: 0 | Status: SHIPPED | OUTPATIENT
Start: 2025-05-23

## 2025-05-23 RX ORDER — HYDROMORPHONE HYDROCHLORIDE 2 MG/ML
INJECTION, SOLUTION INTRAMUSCULAR; INTRAVENOUS; SUBCUTANEOUS
Status: DISCONTINUED | OUTPATIENT
Start: 2025-05-23 | End: 2025-05-23 | Stop reason: SDUPTHER

## 2025-05-23 RX ORDER — CLINDAMYCIN PHOSPHATE 900 MG/50ML
900 INJECTION, SOLUTION INTRAVENOUS ONCE
Status: COMPLETED | OUTPATIENT
Start: 2025-05-23 | End: 2025-05-23

## 2025-05-23 RX ORDER — ONDANSETRON 2 MG/ML
4 INJECTION INTRAMUSCULAR; INTRAVENOUS
Status: DISCONTINUED | OUTPATIENT
Start: 2025-05-23 | End: 2025-05-23 | Stop reason: HOSPADM

## 2025-05-23 RX ORDER — DOXYCYCLINE 100 MG/1
100 CAPSULE ORAL 2 TIMES DAILY
Qty: 20 CAPSULE | Refills: 0 | Status: SHIPPED | OUTPATIENT
Start: 2025-05-23 | End: 2025-06-02

## 2025-05-23 RX ORDER — GLYCOPYRROLATE 0.2 MG/ML
INJECTION INTRAMUSCULAR; INTRAVENOUS
Status: DISCONTINUED | OUTPATIENT
Start: 2025-05-23 | End: 2025-05-23 | Stop reason: SDUPTHER

## 2025-05-23 RX ORDER — LIDOCAINE HYDROCHLORIDE 20 MG/ML
INJECTION, SOLUTION EPIDURAL; INFILTRATION; INTRACAUDAL; PERINEURAL
Status: DISCONTINUED | OUTPATIENT
Start: 2025-05-23 | End: 2025-05-23 | Stop reason: SDUPTHER

## 2025-05-23 RX ORDER — ROCURONIUM BROMIDE 10 MG/ML
INJECTION, SOLUTION INTRAVENOUS
Status: DISCONTINUED | OUTPATIENT
Start: 2025-05-23 | End: 2025-05-23 | Stop reason: SDUPTHER

## 2025-05-23 RX ORDER — SODIUM CHLORIDE, SODIUM LACTATE, POTASSIUM CHLORIDE, CALCIUM CHLORIDE 600; 310; 30; 20 MG/100ML; MG/100ML; MG/100ML; MG/100ML
INJECTION, SOLUTION INTRAVENOUS CONTINUOUS
Status: DISCONTINUED | OUTPATIENT
Start: 2025-05-23 | End: 2025-05-23 | Stop reason: HOSPADM

## 2025-05-23 RX ORDER — DIPHENHYDRAMINE HYDROCHLORIDE 50 MG/ML
12.5 INJECTION, SOLUTION INTRAMUSCULAR; INTRAVENOUS
Status: DISCONTINUED | OUTPATIENT
Start: 2025-05-23 | End: 2025-05-23 | Stop reason: HOSPADM

## 2025-05-23 RX ORDER — DEXAMETHASONE SODIUM PHOSPHATE 4 MG/ML
INJECTION, SOLUTION INTRA-ARTICULAR; INTRALESIONAL; INTRAMUSCULAR; INTRAVENOUS; SOFT TISSUE
Status: DISCONTINUED | OUTPATIENT
Start: 2025-05-23 | End: 2025-05-23 | Stop reason: SDUPTHER

## 2025-05-23 RX ORDER — EPHEDRINE SULFATE/0.9% NACL/PF 25 MG/5 ML
SYRINGE (ML) INTRAVENOUS
Status: DISCONTINUED | OUTPATIENT
Start: 2025-05-23 | End: 2025-05-23 | Stop reason: SDUPTHER

## 2025-05-23 RX ORDER — OXYMETAZOLINE HYDROCHLORIDE 0.05 G/100ML
SPRAY NASAL PRN
Status: DISCONTINUED | OUTPATIENT
Start: 2025-05-23 | End: 2025-05-23 | Stop reason: HOSPADM

## 2025-05-23 RX ORDER — NALOXONE HYDROCHLORIDE 0.4 MG/ML
INJECTION, SOLUTION INTRAMUSCULAR; INTRAVENOUS; SUBCUTANEOUS PRN
Status: DISCONTINUED | OUTPATIENT
Start: 2025-05-23 | End: 2025-05-23 | Stop reason: HOSPADM

## 2025-05-23 RX ORDER — LIDOCAINE HYDROCHLORIDE AND EPINEPHRINE 10; 10 MG/ML; UG/ML
INJECTION, SOLUTION INFILTRATION; PERINEURAL PRN
Status: DISCONTINUED | OUTPATIENT
Start: 2025-05-23 | End: 2025-05-23 | Stop reason: HOSPADM

## 2025-05-23 RX ADMIN — ONDANSETRON 4 MG: 2 INJECTION, SOLUTION INTRAMUSCULAR; INTRAVENOUS at 13:49

## 2025-05-23 RX ADMIN — PROPOFOL 50 MCG/KG/MIN: 10 INJECTION, EMULSION INTRAVENOUS at 12:44

## 2025-05-23 RX ADMIN — GLYCOPYRROLATE 0.2 MG: 0.2 INJECTION INTRAMUSCULAR; INTRAVENOUS at 12:34

## 2025-05-23 RX ADMIN — SODIUM CHLORIDE, POTASSIUM CHLORIDE, SODIUM LACTATE AND CALCIUM CHLORIDE: 600; 310; 30; 20 INJECTION, SOLUTION INTRAVENOUS at 11:18

## 2025-05-23 RX ADMIN — LIDOCAINE HYDROCHLORIDE 100 MG: 20 INJECTION, SOLUTION EPIDURAL; INFILTRATION; INTRACAUDAL; PERINEURAL at 12:42

## 2025-05-23 RX ADMIN — HYDROMORPHONE HYDROCHLORIDE 1 MG: 2 INJECTION, SOLUTION INTRAMUSCULAR; INTRAVENOUS; SUBCUTANEOUS at 12:34

## 2025-05-23 RX ADMIN — EPHEDRINE SULFATE 5 MG: 5 INJECTION INTRAVENOUS at 14:00

## 2025-05-23 RX ADMIN — DEXMEDETOMIDINE 20 MCG: 200 INJECTION, SOLUTION INTRAVENOUS at 12:34

## 2025-05-23 RX ADMIN — DEXAMETHASONE SODIUM PHOSPHATE 10 MG: 4 INJECTION INTRA-ARTICULAR; INTRALESIONAL; INTRAMUSCULAR; INTRAVENOUS; SOFT TISSUE at 12:50

## 2025-05-23 RX ADMIN — ROCURONIUM BROMIDE 50 MG: 50 INJECTION INTRAVENOUS at 12:43

## 2025-05-23 RX ADMIN — SODIUM CHLORIDE, POTASSIUM CHLORIDE, SODIUM LACTATE AND CALCIUM CHLORIDE: 600; 310; 30; 20 INJECTION, SOLUTION INTRAVENOUS at 12:55

## 2025-05-23 RX ADMIN — EPHEDRINE SULFATE 5 MG: 5 INJECTION INTRAVENOUS at 13:27

## 2025-05-23 RX ADMIN — EPHEDRINE SULFATE 5 MG: 5 INJECTION INTRAVENOUS at 13:04

## 2025-05-23 RX ADMIN — SUGAMMADEX 300 MG: 100 INJECTION, SOLUTION INTRAVENOUS at 14:20

## 2025-05-23 RX ADMIN — EPHEDRINE SULFATE 5 MG: 5 INJECTION INTRAVENOUS at 13:48

## 2025-05-23 RX ADMIN — HYDROMORPHONE HYDROCHLORIDE 1 MG: 2 INJECTION, SOLUTION INTRAMUSCULAR; INTRAVENOUS; SUBCUTANEOUS at 13:15

## 2025-05-23 RX ADMIN — FAMOTIDINE 20 MG: 10 INJECTION, SOLUTION INTRAVENOUS at 12:34

## 2025-05-23 RX ADMIN — CLINDAMYCIN PHOSPHATE 900 MG: 900 INJECTION, SOLUTION INTRAVENOUS at 12:34

## 2025-05-23 RX ADMIN — PROPOFOL 200 MG: 10 INJECTION, EMULSION INTRAVENOUS at 12:43

## 2025-05-23 ASSESSMENT — PAIN SCALES - GENERAL
PAINLEVEL_OUTOF10: 0

## 2025-05-23 ASSESSMENT — PAIN - FUNCTIONAL ASSESSMENT: PAIN_FUNCTIONAL_ASSESSMENT: 0-10

## 2025-05-23 NOTE — INTERVAL H&P NOTE
Update History & Physical    The patient's History and Physical was reviewed with the patient and I examined the patient. There was no change. The surgical site was confirmed by the patient and me.     Plan: The risks, benefits, expected outcome, and alternative to the recommended procedure have been discussed with the patient. Patient understands and wants to proceed with the procedure.     Chest CTAB  Cardiac RRR    Electronically signed by Bhavya Connell MD on 5/23/2025 at 11:59 AM

## 2025-05-23 NOTE — PERIOP NOTE
POST ANESTHESIA CARE    DISCHARGE / TRANSFER NOTE  Felix Nicolas was:    [x] discharged        via   [x] Wheelchair          to [x] Private Vehicle     [] transferred    [] Carried    [] Taxi / Vehicle \"for Hire\"  [] Walk out   [] Ambulance / Medical Transportation   [] Stretcher   [] Hospital room _**_           [] Bed      Patient was escorted by:      [x] Nurse   [] Volunteer  [] Transporter / Technician  [] Parent      [] Spouse / Family /      Patient verbalized     [x] appreciation and was very pleased with care received   [] frustration with care received       throughout their stay.    Patient was discharged in     [x] pleasant mood  [] sad mood  [] mad mood .     Pain at discharge/transfer was      0  /10.    Discharge, medication and follow-up instructions were verbalized as understood prior to discharge  (if applicable for same-day procedures being discharged.)    All personal belongings have been returned to patient, and patient/family verbally confirm receiving belongings as all present.

## 2025-05-23 NOTE — OP NOTE
Operative Note      Patient: Felix Nicolas  YOB: 1990  MRN: [id number]    Date of Procedure: 5/23/2025    Pre-Op Diagnosis Codes:      * Chronic pansinusitis [J32.4]     * Nasal septal deviation [J34.2]    Post-Op Diagnosis: Same       Procedure(s):  SINUS ENDOSCOPY FUNCTIONAL SURGERY IMAGE GUIDED, SEPTOPLASTY, BILATERAL INFERIOR TURBINATE REDUCTION, BILATERAL MAXILLARY ANTROSTOMY, BILATERAL TOTAL ETHMOIDECTOMY    Surgeon(s):  Bhavya Connell MD    Assistant:   * No surgical staff found *    Anesthesia: General    Estimated Blood Loss (mL): less than 50     Complications: None    Specimens:   * No specimens in log *    Implants:  * No implants in log *      Drains: * No LDAs found *    Findings:  Grade 3 polyp, right middle meatus and sphenoethmoid recess  Grade 2 polyp, left middle meatus  Right septal spur  Posi sep bilateral nares     Detailed Description of Procedure:   Felix and his mom were met in the preoperative holding area and consent was verified.  He was brought to the operating room and positioned supine on the operating room table.  He underwent induction of anesthesia and was endotracheally intubated by the anesthesia team without any difficulty.  A preoperative timeout was performed and all those present were in agreement.  He did receive intraoperative Ancef.    We utilized the [employer]onic Stealth intraoperative CT navigation device.  This was calibrated and verified to be accurate.    The nose was packed bilaterally with oxymetazoline pledgets.  After several minutes these were removed and a total of 8 mL of 1% lidocaine with 1-100,000 parts epinephrine was infiltrated into the standard injection sites in the middle meatus and septum bilaterally.      We perform nasal endoscopy bilaterally with findings of a grade 3 polyp from the right middle meatus and a grade 2-3 polyp from the left middle meatus, with a right septal spur.  Then starting on the left, the middle turbinate was

## 2025-05-23 NOTE — ANESTHESIA PRE PROCEDURE
Department of Anesthesiology  Preprocedure Note       Name:  Felix Nicolas   Age:  34 y.o.  :  1990                                          MRN:  [id number]         Date:  2025      Surgeon: Surgeon(s):  Bhavya Connell MD    Procedure: Procedure(s):  SINUS ENDOSCOPY FUNCTIONAL SURGERY IMAGE GUIDED, SEPTOPLASTY, BILATERAL INFERIOR TURBINATE REDUCTION, BILATERAL MAXILLARY ANTROSTOMY, BILATERAL TOTAL ETHMOIDECTOMY, POSSIBLE FRONTAL SINUSOTOMY    Medications prior to admission:   Prior to Admission medications    Not on File       Current medications:    No current facility-administered medications for this encounter.       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   • Chronic pansinusitis J32.4   • Nasal septal deviation J34.2       Past Medical History:        Diagnosis Date   • Chronic nasal congestion    • Nasal septal deviation    • Recurrent sinusitis        Past Surgical History:        Procedure Laterality Date   • KNEE ARTHROSCOPY Left    • LEG SURGERY Left        Social History:    Social History     Tobacco Use   • Smoking status: Never   • Smokeless tobacco: Never   Substance Use Topics   • Alcohol use: Yes     Comment: few drinks/year                                Counseling given: Not Answered      Vital Signs (Current): There were no vitals filed for this visit.                                           BP Readings from Last 3 Encounters:   25 (!) 135/92   25 118/76   25 132/76       NPO Status:                                                                                 BMI:   Wt Readings from Last 3 Encounters:   25 120.2 kg (264 lb 15.9 oz)   25 118.8 kg (262 lb)   25 120.7 kg (266 lb)     There is no height or weight on file to calculate BMI.    CBC:   Lab

## 2025-05-23 NOTE — DISCHARGE INSTRUCTIONS
You have packing in your nose on both sides, this is dissolvable  Use nasal saline every few hours while awake  Use a sinus rinse 1-2 times daily         Jeff Nevarez [facility]  [address], [address]  [place]. [address]  Phone  [phone]   Fax  [phone]    Endoscopic Sinus Surgery Instructions      AFTER SURGERY    · DRESSING: Bloody drainage from the nose is normal for 3-7 days following surgery. The outside gauze dressing needs to be change when soiled or saturated. A 4x4 gauze folded in half and held in place by a strip of paper tape below/over the nostrils is sufficient.    · PACKING: Nasal packing is sometimes used after surgery. Do not blow the nose with packing in place. It is normal to have bad breath and/or smell a foul odor while the packing is in place. In many cases a packing material that slowly absorbs and dissolves over time is used. If you have this packing it does not need to be removed after surgery.    · MEDICATIONS: Headache and sinus or nasal discomfort is common after surgery and you should have medication to help with this. Antibiotics may be prescribed.    · ACTIVITY: No swimming or strenuous activities for 14 days after the surgery. No bending over. It is OK to shower. Do not blow your nose for the first week after surgery. Sneeze with your mouth open.    · DIET: Eat and drink normally. Avoid alcoholic beverages while taking medication.    · FOLLOW UP: You will need to be seen by your doctor for a follow up 1 week following surgery depending on the presence or type of packing used or need for post-operative cleaning. Specific instructions will be given to you at the time of discharge.    · NASAL RINSE: During the 1st week after surgery, you should keep inside the nose moist with saline spray used 3-4 times daily. Saline irrigations are recommended to reduce crusting and to keep the sinus open and clear. Irrigate at

## 2025-05-23 NOTE — ANESTHESIA POSTPROCEDURE EVALUATION
Department of Anesthesiology  Postprocedure Note    Patient: Felix Nicolas  MRN: [id number]  YOB: 1990  Date of evaluation: 5/23/2025    Procedure Summary       Date: 05/23/25 Room / Location: [facility] OR [address] AMBULATORY OR    Anesthesia Start: 1234 Anesthesia Stop: 1436    Procedure: SINUS ENDOSCOPY FUNCTIONAL SURGERY IMAGE GUIDED, SEPTOPLASTY, BILATERAL INFERIOR TURBINATE REDUCTION, BILATERAL MAXILLARY ANTROSTOMY, BILATERAL TOTAL ETHMOIDECTOMY (Bilateral: Nose) Diagnosis:       Chronic pansinusitis      Nasal septal deviation      (Chronic pansinusitis [J32.4])      (Nasal septal deviation [J34.2])    Surgeons: Bhavya Connell MD Responsible Provider: Ramakrishna Saenz MD    Anesthesia Type: General ASA Status: Not recorded            Anesthesia Type: General    Rebecca Phase I: Rebecca Score: 7    Rebecca Phase II:      Anesthesia Post Evaluation    Patient location during evaluation: PACU  Patient participation: complete - patient participated  Level of consciousness: awake  Pain score: 0  Airway patency: patent  Nausea & Vomiting: no nausea and no vomiting  Cardiovascular status: blood pressure returned to baseline  Respiratory status: acceptable  Hydration status: euvolemic  Multimodal analgesia pain management approach  Pain management: adequate    No notable events documented.

## 2025-05-27 ENCOUNTER — TELEPHONE (OUTPATIENT)
Age: 35
End: 2025-05-27

## 2025-05-27 NOTE — TELEPHONE ENCOUNTER
Good morning,    Mother of pt requesting a call back to discuss post op concerns on pt behalf. Best contact is [phone]

## 2025-06-02 ENCOUNTER — OFFICE VISIT (OUTPATIENT)
Age: 35
End: 2025-06-02
Payer: MEDICAID

## 2025-06-02 VITALS
OXYGEN SATURATION: 96 % | HEIGHT: 73 IN | BODY MASS INDEX: 35.56 KG/M2 | DIASTOLIC BLOOD PRESSURE: 80 MMHG | HEART RATE: 76 BPM | SYSTOLIC BLOOD PRESSURE: 104 MMHG

## 2025-06-02 DIAGNOSIS — J32.4 CHRONIC PANSINUSITIS: Primary | ICD-10-CM

## 2025-06-02 PROCEDURE — 31231 NASAL ENDOSCOPY DX: CPT | Performed by: OTOLARYNGOLOGY

## 2025-06-02 PROCEDURE — 99024 POSTOP FOLLOW-UP VISIT: CPT | Performed by: OTOLARYNGOLOGY

## 2025-06-03 NOTE — PROGRESS NOTES
Identified pt with two pt identifiers(name and ). Reviewed record in preparation for visit and have obtained necessary documentation. All patient medications has been reviewed.  Chief Complaint   Patient presents with    Post-Op Check       Vitals:    25 1148   BP: 104/80   Pulse: 76   SpO2: 96%                   Coordination of Care Questionnaire:   1) Have you been to an emergency room, urgent care, or hospitalized since your last visit?   no       2. Have seen or consulted any other health care provider since your last visit? no        Patient is accompanied by self I have received verbal consent from Felix Nicolas to discuss any/all medical information while they are present in the room.  
Felix Nicolas  1990  [id number]    6/2/2025    1 week s/p FESS   Doing well  No issues    /80   Pulse 76   Ht 1.842 m (6' 0.5\")   SpO2 96%   BMI 35.56 kg/m²     Comfortable  Anterior rhinoscopy benign     Nasal endoscopy shows majority of packing has come out at this point   Some scabbing along nasal lining at site of septal incision    PROCEDURE: NASAL ENDOSCOPY    Preoperative Diagnosis: S/p FESS      Postoperative Diagnosis: Same     Procedure: Nasal Endoscopy    Anesthesia: Topical 4% Lidocaine, Oxymetazoline    Description of Procedure: Verbal informed consent was obtained. After application of topical anesthetic and decongestant, the endoscope was introduced to the patient's nare. It was passed through the nose and into the nasopharynx. The scope was then withdrawn and repeated on the opposing nare. The patient tolerated the procedure well.     Findings: Septum without deformity or deviation. Bilateral inferior turbinates without hypertrophy. No polyposis or purulence. Middle meatus largely clear, minimal packing, mucus in middle meatus bilaterally, patent maxillary antrostomy. Nasopharynx clear without masses or lesions. Eustachian tube orifice unremarkable.     A/P  Routine post op course  Packing has come out on its own - not much to debride today  Add saline irrigations  Follow up 1 mo  Will evaluate allergy further at next visit     Bhavya Connell MD   [facility]  [address] [place] [address] [address]  [place] [address]  Office Phone: [phone]        
patient

## 2025-07-03 ENCOUNTER — OFFICE VISIT (OUTPATIENT)
Age: 35
End: 2025-07-03

## 2025-07-03 VITALS
BODY MASS INDEX: 35.76 KG/M2 | RESPIRATION RATE: 16 BRPM | HEART RATE: 89 BPM | SYSTOLIC BLOOD PRESSURE: 112 MMHG | DIASTOLIC BLOOD PRESSURE: 80 MMHG | WEIGHT: 269.8 LBS | HEIGHT: 73 IN | OXYGEN SATURATION: 98 %

## 2025-07-03 DIAGNOSIS — J32.4 CHRONIC PANSINUSITIS: Primary | ICD-10-CM

## 2025-07-03 DIAGNOSIS — J34.2 NASAL SEPTAL DEVIATION: ICD-10-CM

## 2025-07-03 NOTE — PROGRESS NOTES
Felix Nicolas  1990  [id number]      7/3/2025    6 weeks s/p FESS   Doing well  No issues  Breathing well  No further rhinitis     /80 (BP Site: Left Upper Arm, Patient Position: Sitting, BP Cuff Size: Large Adult)   Pulse 89   Resp 16   Ht 1.842 m (6' 0.5\")   Wt 122.4 kg (269 lb 12.8 oz)   SpO2 98%   BMI 36.09 kg/m²     Comfortable  Anterior rhinoscopy benign       PROCEDURE: NASAL ENDOSCOPY    Preoperative Diagnosis: S/p FESS      Postoperative Diagnosis: Same     Procedure: Nasal Endoscopy    Anesthesia: Topical 4% Lidocaine, Oxymetazoline    Description of Procedure: Verbal informed consent was obtained. After application of topical anesthetic and decongestant, the endoscope was introduced to the patient's nare. It was passed through the nose and into the nasopharynx. The scope was then withdrawn and repeated on the opposing nare. The patient tolerated the procedure well.     Findings: Septum without deformity or deviation. Bilateral inferior turbinates without hypertrophy. No polyposis or purulence. Middle meatus clear, patent maxillary antrostomy, ethmoidectomy. Nasopharynx clear without masses or lesions. Eustachian tube orifice unremarkable.     A/P  Routine post op course  Continue saline irrigations  Follow up 2 months, allergy test first see me after  Information for PCP offices provided to patient       Bhavya Connell MD   [facility]  [address] [place] [address] [address]  [place] [address]  Office Phone: [phone]

## (undated) DEVICE — MAJOR ORTHO PROCEDURE PACK: Brand: MEDLINE INDUSTRIES, INC.

## (undated) DEVICE — STOCKINETTE,IMPERVIOUS,12X48,STERILE: Brand: MEDLINE

## (undated) DEVICE — CONTAINER,SPECIMEN,PNEU TUBE,4OZ,OR STRL: Brand: MEDLINE

## (undated) DEVICE — 3M™ STERI-DRAPE™ U-DRAPE 1015: Brand: STERI-DRAPE™

## (undated) DEVICE — YANKAUER,BULB TIP,W/O VENT,RIGID,STERILE: Brand: MEDLINE

## (undated) DEVICE — PROBE ABLAT 90DEG ASPIR MULTIPORT BPLR RF 1 PC ELECTRD ERGO

## (undated) DEVICE — COVER LT HNDL BLU PLAS

## (undated) DEVICE — STAPLER SKIN H3.9MM WIRE DIA0.58MM CRWN 6.9MM 35 STPL ROT

## (undated) DEVICE — BLADE 1884006EM RAD40 4MM M4 ROTATE ROHS: Brand: FUSION®

## (undated) DEVICE — DRAPE,REIN 53X77,STERILE: Brand: MEDLINE

## (undated) DEVICE — ARTHROSCOPY PROCEDURE: Brand: MEDLINE INDUSTRIES, INC.

## (undated) DEVICE — BLADE SHV L13CM DIA4MM BNE CUT AGG DEB COOLCUT

## (undated) DEVICE — SOLUTION IRRIG 3000ML 0.9% SOD CHL USP UROMATIC PLAS CONT

## (undated) DEVICE — PADDING CAST W4INXL4YD SPUN DACRON POLY POR SYN VERSATILE

## (undated) DEVICE — SYRINGE IRRIG 60ML SFT PLIABLE BLB EZ TO GRP 1 HND USE W/

## (undated) DEVICE — GLOVE SURG SZ 8 L12IN FNGR THK79MIL GRN LTX FREE

## (undated) DEVICE — PREP SKN CHLRAPRP APL 26ML STR --

## (undated) DEVICE — TUBE IRRIG L8IN LNG PT W/ CONN FOR PMP SYS REDEUCE

## (undated) DEVICE — PADDING CAST W4INXL4YD ST COT COHESIVE HND TEARABLE SPEC

## (undated) DEVICE — 3M™ STERI-DRAPE™ U-DRAPE 1067 1067 5/BX 4BX/CS/CTN&#X20;: Brand: STERI-DRAPE™

## (undated) DEVICE — SUTURE ETHLN SZ 3-0 L18IN NONABSORBABLE BLK FS-1 L24MM 3/8 663H

## (undated) DEVICE — GLOVE SURG SZ 8 CRM LTX FREE POLYISOPRENE POLYMER BEAD ANTI

## (undated) DEVICE — SOLUTION IV 1000 ML 0.9 NACL INJ USP EXCEL PLAS CONTAINER

## (undated) DEVICE — ARMBRD IV STRP 1.5X32IN FOAM -- DISP

## (undated) DEVICE — 3.0MM ROUND FLUTED AGGRESSIVE

## (undated) DEVICE — ZIMMER® STERILE DISPOSABLE TOURNIQUET CUFF WITH PLC, DUAL PORT, SINGLE BLADDER, 34 IN. (86 CM)

## (undated) DEVICE — 4-PORT MANIFOLD: Brand: NEPTUNE 2

## (undated) DEVICE — ELECTRODE PT RET AD L9FT HI MOIST COND ADH HYDRGEL CORDED

## (undated) DEVICE — SUT VCRL + 0 27IN CT2 UD --

## (undated) DEVICE — PAD ABSORBENT 40X28 IN POLY BACKING BLU DRI-SAFE

## (undated) DEVICE — GLOVE SURG SZ 85 L12IN FNGR THK79MIL GRN LTX FREE

## (undated) DEVICE — KIT,ANTI FOG,W/SPONGE & FLUID,SOFT PACK: Brand: MEDLINE

## (undated) DEVICE — INSTRUMENT TRACKER 9733533XOM ENT 1PK

## (undated) DEVICE — BLADE 1884080EM TRICUT 4MMX13CM M4 ROHS: Brand: FUSION®

## (undated) DEVICE — SUTURE ABSORBABLE MONOFILAMENT 4-0 SC-1 18 IN PLN GUT 1824H

## (undated) DEVICE — PROBE ABLAT 50DEG ASPIR MULTIPORT BPLR RF 1 PC ELECTRD ERGO

## (undated) DEVICE — PATIENT TRACKER 9734887XOM NON-INVASIVE

## (undated) DEVICE — PADDING CST 4IN STERILE --

## (undated) DEVICE — SPLINT NSL W0.6XL2IN INTNSL CHITOSAN POLYMER HYDRATED

## (undated) DEVICE — 3.2MM X 18.3MM METAL CUTTING HELIOCOIDAL RASP

## (undated) DEVICE — GLOVE SURG SZ 6 THK91MIL LTX FREE SYN POLYISOPRENE ANTI

## (undated) DEVICE — CODMAN® SURGICAL PATTIES 1/2" X 3" (1.27CM X 7.62CM): Brand: CODMAN®

## (undated) DEVICE — SOLUTION IRRIG 500ML 0.9% SOD CHL USP POUR PLAS BTL

## (undated) DEVICE — PADDING CAST W6INXL4YD SYNTHETIC NATURAL PROTOUCH

## (undated) DEVICE — SOUTHSIDE TURNOVER: Brand: MEDLINE INDUSTRIES, INC.

## (undated) DEVICE — GLOVE ORTHO 8   MSG9480

## (undated) DEVICE — TUBING PMP L8FT LNG W/ CONN FOR AR-6400 REDEUCE

## (undated) DEVICE — BNDG ELAS HK LOOP 4X5YD NS -- MATRIX

## (undated) DEVICE — BNDG ELAS HK LOOP 6X5YD NS -- MATRIX

## (undated) DEVICE — SPONGE GZ W4XL4IN COT 12 PLY TYP VII WVN C FLD DSGN

## (undated) DEVICE — GARMENT,MEDLINE,DVT,INT,CALF,MED, GEN2: Brand: MEDLINE

## (undated) DEVICE — MINOR ENT-SFMCASU: Brand: MEDLINE INDUSTRIES, INC.

## (undated) DEVICE — PREP PAD BNS: Brand: CONVERTORS

## (undated) DEVICE — BASIC SINGLE BASIN-LF: Brand: MEDLINE INDUSTRIES, INC.

## (undated) DEVICE — BANDAGE COBAN 4 IN COMPR W4INXL5YD FOAM COHESIVE QUIK STK SELF ADH SFT

## (undated) DEVICE — DRAPE EQUIP C ARM 74X42 IN MOB XR W/ TIE RUBBER BND LF

## (undated) DEVICE — TRANSFER SET 3": Brand: MEDLINE INDUSTRIES, INC.

## (undated) DEVICE — COAGULATOR SUCT 10FR LAIN FTSWCH ACTIVATION DISP VALLEYLAB

## (undated) DEVICE — INTENDED FOR TISSUE SEPARATION, AND OTHER PROCEDURES THAT REQUIRE A SHARP SURGICAL BLADE TO PUNCTURE OR CUT.: Brand: BARD-PARKER ® CARBON RIB-BACK BLADES

## (undated) DEVICE — FIRM 4CM: Brand: NASOPORE

## (undated) DEVICE — NDL SINE QNCKE 22GAX1.5IN BLK --

## (undated) DEVICE — BLADE 1882040 5PK INFERIOR TURB 2MM

## (undated) DEVICE — TUBING, SUCTION, 1/4" X 12', STRAIGHT: Brand: MEDLINE

## (undated) DEVICE — SUT VCRL + 2-0 27IN CT2 UD -- 36/BX

## (undated) DEVICE — GOWN SURG RNFRCD TOWEL XL STRL

## (undated) DEVICE — DRAPE,U/ SHT,SPLIT,PLAS,STERIL: Brand: MEDLINE

## (undated) DEVICE — DRESSING,GAUZE,XEROFORM,CURAD,5"X9",ST: Brand: CURAD

## (undated) DEVICE — C-ARMOR C-ARM EQUIPMENT COVERS CLEAR STERILE UNIVERSAL FIT 12 PER CASE: Brand: C-ARMOR

## (undated) DEVICE — SOLUTION IRRIG 1000ML H2O PIC PLAS SHATTERPROOF CONTAINER